# Patient Record
Sex: FEMALE | Race: BLACK OR AFRICAN AMERICAN | NOT HISPANIC OR LATINO | URBAN - METROPOLITAN AREA
[De-identification: names, ages, dates, MRNs, and addresses within clinical notes are randomized per-mention and may not be internally consistent; named-entity substitution may affect disease eponyms.]

---

## 2018-03-28 PROBLEM — Z00.00 ENCOUNTER FOR PREVENTIVE HEALTH EXAMINATION: Status: ACTIVE | Noted: 2018-03-28

## 2018-04-02 ENCOUNTER — OUTPATIENT (OUTPATIENT)
Dept: OUTPATIENT SERVICES | Facility: HOSPITAL | Age: 53
LOS: 1 days | End: 2018-04-02
Payer: COMMERCIAL

## 2018-04-02 ENCOUNTER — APPOINTMENT (OUTPATIENT)
Dept: RADIOLOGY | Facility: IMAGING CENTER | Age: 53
End: 2018-04-02
Payer: COMMERCIAL

## 2018-04-02 DIAGNOSIS — Z00.8 ENCOUNTER FOR OTHER GENERAL EXAMINATION: ICD-10-CM

## 2018-04-02 PROCEDURE — 73564 X-RAY EXAM KNEE 4 OR MORE: CPT

## 2018-04-02 PROCEDURE — 73564 X-RAY EXAM KNEE 4 OR MORE: CPT | Mod: 26,RT

## 2018-10-18 ENCOUNTER — APPOINTMENT (OUTPATIENT)
Dept: ORTHOPEDIC SURGERY | Facility: CLINIC | Age: 53
End: 2018-10-18
Payer: COMMERCIAL

## 2018-10-18 DIAGNOSIS — M22.2X1 PATELLOFEMORAL DISORDERS, RIGHT KNEE: ICD-10-CM

## 2018-10-18 DIAGNOSIS — M77.12 LATERAL EPICONDYLITIS, LEFT ELBOW: ICD-10-CM

## 2018-10-18 PROCEDURE — 73080 X-RAY EXAM OF ELBOW: CPT | Mod: LT

## 2018-10-18 PROCEDURE — 99203 OFFICE O/P NEW LOW 30 MIN: CPT

## 2018-10-18 PROCEDURE — 73564 X-RAY EXAM KNEE 4 OR MORE: CPT | Mod: RT

## 2020-08-17 ENCOUNTER — TRANSCRIPTION ENCOUNTER (OUTPATIENT)
Age: 55
End: 2020-08-17

## 2020-12-23 ENCOUNTER — TRANSCRIPTION ENCOUNTER (OUTPATIENT)
Age: 55
End: 2020-12-23

## 2021-02-21 ENCOUNTER — TRANSCRIPTION ENCOUNTER (OUTPATIENT)
Age: 56
End: 2021-02-21

## 2021-07-18 ENCOUNTER — TRANSCRIPTION ENCOUNTER (OUTPATIENT)
Age: 56
End: 2021-07-18

## 2021-12-02 ENCOUNTER — TRANSCRIPTION ENCOUNTER (OUTPATIENT)
Age: 56
End: 2021-12-02

## 2022-07-24 ENCOUNTER — NON-APPOINTMENT (OUTPATIENT)
Age: 57
End: 2022-07-24

## 2023-06-16 ENCOUNTER — APPOINTMENT (OUTPATIENT)
Dept: ORTHOPEDIC SURGERY | Facility: CLINIC | Age: 58
End: 2023-06-16
Payer: COMMERCIAL

## 2023-06-16 VITALS — BODY MASS INDEX: 25.11 KG/M2 | HEIGHT: 67 IN | WEIGHT: 160 LBS

## 2023-06-16 DIAGNOSIS — M19.90 UNSPECIFIED OSTEOARTHRITIS, UNSPECIFIED SITE: ICD-10-CM

## 2023-06-16 DIAGNOSIS — I10 ESSENTIAL (PRIMARY) HYPERTENSION: ICD-10-CM

## 2023-06-16 PROCEDURE — 99204 OFFICE O/P NEW MOD 45 MIN: CPT

## 2023-06-16 NOTE — DISCUSSION/SUMMARY
CHEMO RETURN APPOINTMENT ORDERS:       Next cycle to start in 12  weeks      Additional RN visits: None     Alternate visits between MD & PA    Schedule return visit with MD, CBC, CMP TIMI     Tx hours needed: ______     Imaging:       
[de-identified] : advised oa is severe. advised 30-40% chance of success with arthroscopy. recommend PT, HA, and discussed possible need for future TKA. \par patient will plan to use nsaids and PT for now. we will submit for HA and plan visco thereafter.\par \par ----------------------------------------------------------------------------\par \par The patient was advised of the diagnosis.  The natural history of the pathology was explained in full. All questions were answered.  The risks and benefits of conservative and interventional treatment alternatives were explained to the patient\par \par \par ----------------------------------------------------------------------------\par \par MRI(s) and/or other advanced imaging studies (CT/etc) were reviewed with the patient. Implications of the study(ies) together with the patient's clinical picture were discussed to formulate a working diagnosis and management options were detailed.\par

## 2023-06-16 NOTE — HISTORY OF PRESENT ILLNESS
[Gradual] : gradual [8] : 8 [Dull/Aching] : dull/aching [Radiating] : radiating [Sharp] : sharp [Frequent] : frequent [Leisure] : leisure [Sleep] : sleep [Meds] : meds [Walking/activity] : walking/activity [Physical therapy] : physical therapy [Standing] : standing [Walking] : walking [Exercising] : exercising [Stairs] : stairs [de-identified] : 58 f with left knee issues since 2/2022. notes she had swelling and pain and visited ED and she saw doctor maryann. she did PT, cortisone injections x2. she did HA injections 8/2022 with relief until 4 months. she has pain with ambulation, sometimes at rest. pain is mostly anterior and medial and latera.  [] : no [FreeTextEntry1] : Left KNee [FreeTextEntry5] : Patient ROBBY ARMANDO is 58 years old and is being evaluated for the LEFT KNEE. Patient was diagnosed with a torn meniscus by a previous provider. [FreeTextEntry7] : Pain occasionally moves up the hamstring [de-identified] : Patient uses ibuprofen when pain gets severe.

## 2023-06-16 NOTE — DATA REVIEWED
[MRI] : MRI [Left] : left [Knee] : knee [I independently reviewed and interpreted images and report] : I independently reviewed and interpreted images and report [FreeTextEntry1] : [facility]- left knee lateral compartment severe djd. mild/mod medial/pf djd

## 2023-06-16 NOTE — IMAGING
[de-identified] : \par ----------------------------------------------------------------------------\par \par Left knee exam: \par \par Skin: no significant pertinent finding\par Inspection: \par    ++Effusion\par    (-) Malalignment\par    (-) Swelling\par    (-) Quad atrophy\par    (-) J-sign\par ROM: \par    0 - 125 degrees of flexion.\par Tenderness: \par    +MJLT\par    +LJLT\par    +Medial patellar facet tenderness\par    +Lateral patellar facet tenderness\par    +Crepitus\par    (-) Patellar grind tenderness\par    (-) Patellar tendon\par    (-) Quad tendon\par    Other: \par Stability: \par    (-) Lachman\par    (-) Varus/Valgus instability\par    (-) Posterior drawer\par    (-) Patellar translation: wnl\par Additional tests: \par    +McMurrays test\par    (-) Patellar apprehension\par    Other: \par Strength: 5/5 Q/H/TA/GS/EHL\par Neuro: In tact to light touch throughout, DTR's wnl\par Vascularity: Extremity warm and well perfused\par Gait: normal.\par \par \par  [Left] : left knee [Outside films reviewed] : Outside films reviewed [Moderate tricompartmental OA lateral narrowing] : Moderate tricompartmental OA lateral narrowing

## 2024-06-07 ENCOUNTER — APPOINTMENT (OUTPATIENT)
Dept: ORTHOPEDIC SURGERY | Facility: CLINIC | Age: 59
End: 2024-06-07

## 2024-06-10 ENCOUNTER — APPOINTMENT (OUTPATIENT)
Dept: ORTHOPEDIC SURGERY | Facility: CLINIC | Age: 59
End: 2024-06-10
Payer: COMMERCIAL

## 2024-06-10 VITALS — HEIGHT: 66 IN | WEIGHT: 152 LBS | BODY MASS INDEX: 24.43 KG/M2

## 2024-06-10 DIAGNOSIS — Z78.9 OTHER SPECIFIED HEALTH STATUS: ICD-10-CM

## 2024-06-10 DIAGNOSIS — M17.12 UNILATERAL PRIMARY OSTEOARTHRITIS, LEFT KNEE: ICD-10-CM

## 2024-06-10 DIAGNOSIS — E11.9 TYPE 2 DIABETES MELLITUS W/OUT COMPLICATIONS: ICD-10-CM

## 2024-06-10 DIAGNOSIS — S86.011D STRAIN OF RIGHT ACHILLES TENDON, SUBSEQUENT ENCOUNTER: ICD-10-CM

## 2024-06-10 PROCEDURE — 99214 OFFICE O/P EST MOD 30 MIN: CPT

## 2024-06-10 NOTE — HISTORY OF PRESENT ILLNESS
[Gradual] : gradual [5] : 5 [Sharp] : sharp [Frequent] : frequent [Meds] : meds [Standing] : standing [Walking] : walking [Exercising] : exercising [Stairs] : stairs [de-identified] : 58 f with left knee issues since 2/2022. notes she had swelling and pain and visited ED and she saw doctor maryann. she did PT, cortisone injections x2. she did HA injections 8/2022 with relief until 4 months. she has pain with ambulation, sometimes at rest. pain is mostly anterior and medial and lateral.   [] : no [de-identified] : MRI

## 2024-06-10 NOTE — DATA REVIEWED
[Left] : left [Knee] : knee [MRI] : MRI [Right] : of the right [Ankle] : ankle [I independently reviewed and interpreted images and report] : I independently reviewed and interpreted images and report [FreeTextEntry1] : [facility]- left knee lateral compartment severe djd. mild/mod medial/pf djd [FreeTextEntry2] : November 2023: full thickness Achilles tear

## 2024-06-10 NOTE — DISCUSSION/SUMMARY
[de-identified] : Discussed options, HEP Plan for CSI left knee f/u 6-8 weeks ----------------------------------------------------------------------------  Large joint corticosteroid injection given: Left knee  Patient indicated for injection after trial of rest, OTC medications including aspirin, Ibuprofen, Aleve etc or prescription NSAIDS, and/or exercises at home and/ or physical therapy without satisfactory response.  Patient has symptoms including pain, swelling, and/or decreased mobility in the joint. The risks, benefits, and alternatives to corticosteroid injection were explained in full to the patient, including but not limited to infection, sepsis, bleeding, scarring, skin discoloration, temporary increase in pain, syncopal episode, failure to resolve symptoms, allergic reaction, symptom recurrence, and elevation of blood sugar in diabetics. Patient understood the risks. All questions were answered. After discussion of options, patient requested an injection.   Oral informed consent was obtained and sterile technique was utilized for the procedure including the preparation of the solutions used for the injection and betadine followed by alcohol prep to the injection site. Anesthesia was given with ethyl chloride sprayed topically. The injection was delivered. Patient tolerated the procedure well.   Post Procedure Instructions: Patient was advised to call if redness, pain, or fever occur and apply ice for 15 min on and 15 min off later today  Medications delivered: Kenalog 10 mg, Lidocaine: 4 cc ----------------------------------------------------------------------------   All relevant imaging studies pertinent to today's visit, including x-rays, MRI's and/or other advanced imaging studies (CT/etc) were independently interpreted and reviewed with the patient as needed. Implications of the studies together with the patient's clinical picture were discussed to formulate a working diagnosis and management options were detailed.   The patient and/or guardian was advised of the diagnosis.  The natural history of the pathology was explained in full. All questions were answered.  The risks and benefits of conservative and interventional treatment alternatives were explained to the patient  The patient and/or guardian was advised if any advanced diagnostic/imaging study (MRI/CT/etc) is ordered to evaluate potential pathology in the affected area(s), they should follow up in the office to review the results of the study and determine further management that may be indicated.

## 2024-06-10 NOTE — REASON FOR VISIT
[FreeTextEntry2] : new injury right ankle, follow up left knee/ Patient is here bc she had an injury on the right ankle in November of 2023.  Hurt playing [employer]. was reaching for a shot and heard a pop in her posterior ankle. Saw outside ortho and was in a boot for 2 months, doing PT.

## 2024-06-10 NOTE — IMAGING
[Left] : left knee [Outside films reviewed] : Outside films reviewed [Moderate tricompartmental OA lateral narrowing] : Moderate tricompartmental OA lateral narrowing [de-identified] : ----------------------------------------------------------------------------  Left knee exam:   Skin: no significant pertinent finding Inspection:     ++Effusion    (-) Malalignment    (-) Swelling    (-) Quad atrophy    (-) J-sign ROM:     0 - 125 degrees of flexion. Tenderness:     +MJLT    +LJLT    +Medial patellar facet tenderness    +Lateral patellar facet tenderness    +Crepitus    (-) Patellar grind tenderness    (-) Patellar tendon    (-) Quad tendon    Other:  Stability:     (-) Lachman    (-) Varus/Valgus instability    (-) Posterior drawer    (-) Patellar translation: wnl Additional tests:     +McMurrays test    (-) Patellar apprehension    Other:  Strength: 5/5 Q/H/TA/GS/EHL Neuro: In tact to light touch throughout, DTR's wnl Vascularity: Extremity warm and well perfused Gait: normal.     ----------------------------------------------------------------------------   Right foot/ankle exam:  Inspection:   (neg) Effusion, achilles mid substance scar tissue     Swelling:   (neg) Calf                        (neg) Lateral ankle        (neg) Medial ankle                          (neg) Lateral foot          (neg) Medial foot     (neg) Dorsal foot                       (neg) 1st MTP                (neg) Toes    Deformity:  (neg) Hindfoot varus     (neg) Valgus                      (neg) Pes planus            (neg) Pes cavus                      Other: Range of Motion:    DF: 15   PF: 40   Eversion: 30   Inversion: 30 Tenderness:    (neg) Calf                                   (neg)) Tibia       (neg) Syndesmosis    (neg) ATFL/CFL    (neg) Deltoid ligament    (neg) Lateral mal                         (neg) Medial mal    (neg) Lateral joint line                  (neg) Medial joint line     (neg) Anterior ankle joint line    (neg) Achilles insertion               (min) Midsubstance    (neg) Posterior Tibialias              (neg) Anterior Tibialis     (neg) Plantar fascia insertion      (neg) Midsubstance     (neg) Navicular tuberosity          (neg) Talonavicular          (neg) Calcaneocuboid    (neg) Metatarsals                        (neg)) TMT                      (neg) Lisfranc    (neg) 5th MT base             (neg) 1st MTP joint    (neg) Toes                       (neg) Sesamoids  Additional tests:    (neg) Anterior drawer    (neg) Ankle circumduction    (neg) Syndesmosis compression    (neg) Calcaneal compression    (neg) Foot squeeze    (+mild discomfort) Pain with single leg heel rise Strength:    DF: 5/5    PF: 5/5    Eversion: 5/5    Inversion: 5/5    EHL: 5/5    FHL: 5/5 Neuro: Sensation In tact to light touch in all distributions Vascularity: Extremity warm and well perfused Gait: normal

## 2024-09-11 ENCOUNTER — APPOINTMENT (OUTPATIENT)
Dept: ORTHOPEDIC SURGERY | Facility: CLINIC | Age: 59
End: 2024-09-11

## 2025-04-09 ENCOUNTER — APPOINTMENT (OUTPATIENT)
Dept: ORTHOPEDIC SURGERY | Facility: CLINIC | Age: 60
End: 2025-04-09
Payer: COMMERCIAL

## 2025-04-09 DIAGNOSIS — M17.12 UNILATERAL PRIMARY OSTEOARTHRITIS, LEFT KNEE: ICD-10-CM

## 2025-04-09 DIAGNOSIS — M94.261 CHONDROMALACIA, RIGHT KNEE: ICD-10-CM

## 2025-04-09 PROCEDURE — 20610 DRAIN/INJ JOINT/BURSA W/O US: CPT | Mod: 50

## 2025-04-09 PROCEDURE — 73562 X-RAY EXAM OF KNEE 3: CPT | Mod: 50

## 2025-04-09 PROCEDURE — 99213 OFFICE O/P EST LOW 20 MIN: CPT | Mod: 25

## 2025-08-27 ENCOUNTER — APPOINTMENT (OUTPATIENT)
Dept: ORTHOPEDIC SURGERY | Facility: CLINIC | Age: 60
End: 2025-08-27

## 2025-09-03 ENCOUNTER — APPOINTMENT (OUTPATIENT)
Dept: ORTHOPEDIC SURGERY | Facility: CLINIC | Age: 60
End: 2025-09-03

## 2025-09-10 ENCOUNTER — APPOINTMENT (OUTPATIENT)
Dept: ORTHOPEDIC SURGERY | Facility: CLINIC | Age: 60
End: 2025-09-10

## 2025-09-10 DIAGNOSIS — M17.12 UNILATERAL PRIMARY OSTEOARTHRITIS, LEFT KNEE: ICD-10-CM
